# Patient Record
Sex: FEMALE | Race: BLACK OR AFRICAN AMERICAN
[De-identification: names, ages, dates, MRNs, and addresses within clinical notes are randomized per-mention and may not be internally consistent; named-entity substitution may affect disease eponyms.]

---

## 2021-10-12 ENCOUNTER — DASHBOARD ENCOUNTERS (OUTPATIENT)
Age: 71
End: 2021-10-12

## 2021-10-12 ENCOUNTER — OFFICE VISIT (OUTPATIENT)
Dept: URBAN - METROPOLITAN AREA CLINIC 92 | Facility: CLINIC | Age: 71
End: 2021-10-12
Payer: COMMERCIAL

## 2021-10-12 ENCOUNTER — LAB OUTSIDE AN ENCOUNTER (OUTPATIENT)
Dept: URBAN - METROPOLITAN AREA CLINIC 92 | Facility: CLINIC | Age: 71
End: 2021-10-12

## 2021-10-12 DIAGNOSIS — K92.1 HEMATOCHEZIA: ICD-10-CM

## 2021-10-12 DIAGNOSIS — I10 HYPERTENSION, UNSPECIFIED TYPE: ICD-10-CM

## 2021-10-12 PROBLEM — 38341003: Status: ACTIVE | Noted: 2021-10-12

## 2021-10-12 PROBLEM — 66556007: Status: ACTIVE | Noted: 2021-10-12

## 2021-10-12 PROCEDURE — 99204 OFFICE O/P NEW MOD 45 MIN: CPT | Performed by: INTERNAL MEDICINE

## 2021-10-12 NOTE — HPI-TODAY'S VISIT:
Pt seen for recent 1 week of bright red hematochezia. 1 per day. Seen in ER on 9/21 and Hb 9.8. Symptoms resolved spontaneously. Denies diarrhea, constipation, wt loss ro melena. No AC or NSAIDs. Colonoscopy 4 yrs ago reportedly normal. No previous EGD. Does consume ice regularly.

## 2021-10-13 LAB
FERRITIN, SERUM: 23
HEMATOCRIT: 33.8
HEMOGLOBIN: 9.3
IRON BIND.CAP.(TIBC): 406
IRON SATURATION: 6
IRON: 25
MCH: 17.9
MCHC: 27.5
MCV: 65
NRBC: (no result)
PLATELETS: 544
RBC: 5.2
RDW: 19.5
UIBC: 381
WBC: 7.3

## 2021-10-14 ENCOUNTER — TELEPHONE ENCOUNTER (OUTPATIENT)
Dept: URBAN - METROPOLITAN AREA CLINIC 5 | Facility: CLINIC | Age: 71
End: 2021-10-14

## 2021-10-14 PROBLEM — 87522002: Status: ACTIVE | Noted: 2021-10-14
